# Patient Record
Sex: MALE | Race: WHITE | NOT HISPANIC OR LATINO | URBAN - METROPOLITAN AREA
[De-identification: names, ages, dates, MRNs, and addresses within clinical notes are randomized per-mention and may not be internally consistent; named-entity substitution may affect disease eponyms.]

---

## 2023-02-21 ENCOUNTER — CLAIMS CREATED FROM THE CLAIM WINDOW (OUTPATIENT)
Dept: URBAN - METROPOLITAN AREA MEDICAL CENTER 43 | Facility: MEDICAL CENTER | Age: 78
End: 2023-02-21
Payer: MEDICARE

## 2023-02-21 DIAGNOSIS — Z79.01 ANTICOAGULANT LONG-TERM USE: ICD-10-CM

## 2023-02-21 DIAGNOSIS — D50.8 ACQUIRED IRON DEFICIENCY ANEMIA DUE TO DECREASED ABSORPTION: ICD-10-CM

## 2023-02-21 DIAGNOSIS — K92.1 MELENA: ICD-10-CM

## 2023-02-21 DIAGNOSIS — Z79.02 ANTIPLATELET OR ANTITHROMBOTIC LONG-TERM USE: ICD-10-CM

## 2023-02-21 PROCEDURE — 99223 1ST HOSP IP/OBS HIGH 75: CPT | Performed by: PHYSICIAN ASSISTANT

## 2023-02-22 ENCOUNTER — CLAIMS CREATED FROM THE CLAIM WINDOW (OUTPATIENT)
Dept: URBAN - METROPOLITAN AREA MEDICAL CENTER 43 | Facility: MEDICAL CENTER | Age: 78
End: 2023-02-22
Payer: MEDICARE

## 2023-02-22 DIAGNOSIS — D50.9 ANEMIA: ICD-10-CM

## 2023-02-22 DIAGNOSIS — K29.30 CHRONIC SUPERFICIAL GASTRITIS: ICD-10-CM

## 2023-02-22 DIAGNOSIS — K92.1 ACUTE MELENA: ICD-10-CM

## 2023-02-22 PROCEDURE — 43239 EGD BIOPSY SINGLE/MULTIPLE: CPT | Performed by: INTERNAL MEDICINE

## 2023-02-23 ENCOUNTER — CLAIMS CREATED FROM THE CLAIM WINDOW (OUTPATIENT)
Dept: URBAN - METROPOLITAN AREA MEDICAL CENTER 43 | Facility: MEDICAL CENTER | Age: 78
End: 2023-02-23
Payer: MEDICARE

## 2023-02-23 ENCOUNTER — TELEPHONE ENCOUNTER (OUTPATIENT)
Dept: URBAN - METROPOLITAN AREA CLINIC 113 | Facility: CLINIC | Age: 78
End: 2023-02-23

## 2023-02-23 DIAGNOSIS — D12.5 ADENOMA OF SIGMOID COLON: ICD-10-CM

## 2023-02-23 DIAGNOSIS — I25.10 ATHEROSCLEROTIC HEART DISEASE OF NATIVE CORONARY ARTERY WITHOUT ANGINA PECTORIS: ICD-10-CM

## 2023-02-23 DIAGNOSIS — D12.0 ADENOMA OF CECUM: ICD-10-CM

## 2023-02-23 DIAGNOSIS — D64.9 ANEMIA, UNSPECIFIED: ICD-10-CM

## 2023-02-23 DIAGNOSIS — K92.1 MELENA: ICD-10-CM

## 2023-02-23 DIAGNOSIS — K51.40 INFLAMMATORY POLYP OF COLON: ICD-10-CM

## 2023-02-23 DIAGNOSIS — D12.3 ADENOMA OF TRANSVERSE COLON: ICD-10-CM

## 2023-02-23 PROCEDURE — 45385 COLONOSCOPY W/LESION REMOVAL: CPT | Performed by: INTERNAL MEDICINE

## 2023-03-01 ENCOUNTER — OFFICE VISIT (OUTPATIENT)
Dept: URBAN - METROPOLITAN AREA CLINIC 113 | Facility: CLINIC | Age: 78
End: 2023-03-01
Payer: MEDICARE

## 2023-03-01 ENCOUNTER — WEB ENCOUNTER (OUTPATIENT)
Dept: URBAN - METROPOLITAN AREA CLINIC 113 | Facility: CLINIC | Age: 78
End: 2023-03-01

## 2023-03-01 VITALS
HEART RATE: 67 BPM | TEMPERATURE: 97.5 F | BODY MASS INDEX: 23.34 KG/M2 | HEIGHT: 69 IN | SYSTOLIC BLOOD PRESSURE: 138 MMHG | DIASTOLIC BLOOD PRESSURE: 46 MMHG | RESPIRATION RATE: 16 BRPM | WEIGHT: 157.6 LBS

## 2023-03-01 DIAGNOSIS — K64.1 GRADE II HEMORRHOIDS: ICD-10-CM

## 2023-03-01 DIAGNOSIS — D36.9 TUBULAR ADENOMA: ICD-10-CM

## 2023-03-01 DIAGNOSIS — D50.0 IRON DEFICIENCY ANEMIA DUE TO CHRONIC BLOOD LOSS: ICD-10-CM

## 2023-03-01 DIAGNOSIS — K92.1 MELENA: ICD-10-CM

## 2023-03-01 DIAGNOSIS — K57.90 DIVERTICULOSIS: ICD-10-CM

## 2023-03-01 PROBLEM — 724556004: Status: ACTIVE | Noted: 2023-03-01

## 2023-03-01 PROBLEM — 397881000: Status: ACTIVE | Noted: 2023-03-01

## 2023-03-01 PROCEDURE — 99214 OFFICE O/P EST MOD 30 MIN: CPT | Performed by: INTERNAL MEDICINE

## 2023-03-01 RX ORDER — PRAVASTATIN SODIUM 40 MG/1
1 TABLET TABLET ORAL ONCE A DAY
Status: ACTIVE | COMMUNITY

## 2023-03-01 RX ORDER — METFORMIN HYDROCHLORIDE 1000 MG/1
1 TABLET WITH A MEAL TABLET, FILM COATED ORAL ONCE A DAY
Status: ACTIVE | COMMUNITY

## 2023-03-01 RX ORDER — FERROUS SULFATE 325(65) MG
1 TABLET TABLET ORAL ONCE A DAY
Status: ACTIVE | COMMUNITY

## 2023-03-01 RX ORDER — CYANOCOBALAMIN (VITAMIN B-12) 1000 MCG
1 TABLET TABLET ORAL ONCE A DAY
Status: ACTIVE | COMMUNITY

## 2023-03-01 RX ORDER — NEBIVOLOL HYDROCHLORIDE 5 MG/1
1 TABLET TABLET ORAL ONCE A DAY
Status: ACTIVE | COMMUNITY

## 2023-03-01 RX ORDER — CLOPIDOGREL BISULFATE 75 MG/1
1 TABLET TABLET ORAL ONCE A DAY
Status: ACTIVE | COMMUNITY

## 2023-03-01 RX ORDER — CALCIUM CARBONATE/VITAMIN D3 600 MG-10
1 CAPSULE TABLET ORAL ONCE A DAY
Status: ACTIVE | COMMUNITY

## 2023-03-01 RX ORDER — INSULIN DETEMIR 100 [IU]/ML
AS DIRECTED INJECTION, SOLUTION SUBCUTANEOUS
Status: ACTIVE | COMMUNITY

## 2023-03-01 RX ORDER — L-METHYLFOLATE-ALGAE-VIT B12-B6 CAP 3-90.314-2-35 MG 3-90.314-2-35 MG
AS DIRECTED CAP ORAL
Status: ACTIVE | COMMUNITY

## 2023-03-01 NOTE — HPI-TODAY'S VISIT:
79 y/o male presenting for f/u after hospitalization. He was seen at [facility] in Feb 2023 for anemia/melena/hematochezia. he had an EGD and CSC done for these symptoms. EGD showed minmal gastritis. CSC showed several polyps removed, hemorrhiods, and diverticulosis. Colon poath was TA; EGD path was gastritis without H.Pylori. Patient is without any abdominal complaints. No dysphagia, heartburn, regurgitation, unintentional weight loss, nausea, vomiting, hematemesis or melena. Bowels are moving regularly without blood per rectum. No complaints of bloating. No jaundice, icterus.

## 2023-04-21 ENCOUNTER — OFFICE VISIT (OUTPATIENT)
Dept: URBAN - METROPOLITAN AREA CLINIC 112 | Facility: CLINIC | Age: 78
End: 2023-04-21

## 2023-04-21 ENCOUNTER — DASHBOARD ENCOUNTERS (OUTPATIENT)
Age: 78
End: 2023-04-21

## 2023-04-21 ENCOUNTER — OFFICE VISIT (OUTPATIENT)
Dept: URBAN - METROPOLITAN AREA CLINIC 112 | Facility: CLINIC | Age: 78
End: 2023-04-21
Payer: MEDICARE

## 2023-04-21 VITALS
WEIGHT: 150.8 LBS | HEART RATE: 57 BPM | SYSTOLIC BLOOD PRESSURE: 161 MMHG | BODY MASS INDEX: 22.33 KG/M2 | RESPIRATION RATE: 18 BRPM | TEMPERATURE: 97.8 F | HEIGHT: 69 IN | DIASTOLIC BLOOD PRESSURE: 74 MMHG

## 2023-04-21 DIAGNOSIS — D50.0 1. ANEMIA, IRON DEFICIENCY FROM CHRONIC BLOOD LOSS:: ICD-10-CM

## 2023-04-21 PROCEDURE — 91110 GI TRC IMG INTRAL ESOPH-ILE: CPT | Performed by: INTERNAL MEDICINE

## 2023-04-21 RX ORDER — PRAVASTATIN SODIUM 40 MG/1
1 TABLET TABLET ORAL ONCE A DAY
Status: ACTIVE | COMMUNITY

## 2023-04-21 RX ORDER — NEBIVOLOL HYDROCHLORIDE 5 MG/1
1 TABLET TABLET ORAL ONCE A DAY
Status: ACTIVE | COMMUNITY

## 2023-04-21 RX ORDER — CALCIUM CARBONATE/VITAMIN D3 600 MG-10
1 CAPSULE TABLET ORAL ONCE A DAY
Status: ACTIVE | COMMUNITY

## 2023-04-21 RX ORDER — CLOPIDOGREL BISULFATE 75 MG/1
1 TABLET TABLET ORAL ONCE A DAY
Status: ACTIVE | COMMUNITY

## 2023-04-21 RX ORDER — METFORMIN HYDROCHLORIDE 1000 MG/1
1 TABLET WITH A MEAL TABLET, FILM COATED ORAL ONCE A DAY
Status: ACTIVE | COMMUNITY

## 2023-04-21 RX ORDER — L-METHYLFOLATE-ALGAE-VIT B12-B6 CAP 3-90.314-2-35 MG 3-90.314-2-35 MG
AS DIRECTED CAP ORAL
Status: ACTIVE | COMMUNITY

## 2023-04-21 RX ORDER — FERROUS SULFATE 325(65) MG
1 TABLET TABLET ORAL ONCE A DAY
Status: ACTIVE | COMMUNITY

## 2023-04-21 RX ORDER — CYANOCOBALAMIN (VITAMIN B-12) 1000 MCG
1 TABLET TABLET ORAL ONCE A DAY
Status: ACTIVE | COMMUNITY

## 2023-04-21 RX ORDER — INSULIN DETEMIR 100 [IU]/ML
AS DIRECTED INJECTION, SOLUTION SUBCUTANEOUS
Status: ACTIVE | COMMUNITY

## 2023-04-27 ENCOUNTER — TELEPHONE ENCOUNTER (OUTPATIENT)
Dept: URBAN - METROPOLITAN AREA CLINIC 113 | Facility: CLINIC | Age: 78
End: 2023-04-27